# Patient Record
Sex: MALE | Race: WHITE
[De-identification: names, ages, dates, MRNs, and addresses within clinical notes are randomized per-mention and may not be internally consistent; named-entity substitution may affect disease eponyms.]

---

## 2018-01-29 ENCOUNTER — HOSPITAL ENCOUNTER (EMERGENCY)
Dept: HOSPITAL 72 - EMR | Age: 43
LOS: 1 days | Discharge: HOME | End: 2018-01-30
Payer: SELF-PAY

## 2018-01-29 VITALS — SYSTOLIC BLOOD PRESSURE: 151 MMHG | DIASTOLIC BLOOD PRESSURE: 69 MMHG

## 2018-01-29 VITALS — SYSTOLIC BLOOD PRESSURE: 150 MMHG | DIASTOLIC BLOOD PRESSURE: 66 MMHG

## 2018-01-29 VITALS — WEIGHT: 120 LBS | BODY MASS INDEX: 16.25 KG/M2 | HEIGHT: 72 IN

## 2018-01-29 VITALS — SYSTOLIC BLOOD PRESSURE: 148 MMHG | DIASTOLIC BLOOD PRESSURE: 64 MMHG

## 2018-01-29 DIAGNOSIS — R45.851: Primary | ICD-10-CM

## 2018-01-29 DIAGNOSIS — K31.84: ICD-10-CM

## 2018-01-29 DIAGNOSIS — S60.819A: ICD-10-CM

## 2018-01-29 DIAGNOSIS — R10.84: ICD-10-CM
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
## 2018-01-29 LAB
ADD MANUAL DIFF: NO
ALBUMIN SERPL-MCNC: 4.8 G/DL (ref 3.4–5)
ALBUMIN/GLOB SERPL: 1.3 {RATIO} (ref 1–2.7)
ALP SERPL-CCNC: 71 U/L (ref 46–116)
ALT SERPL-CCNC: 20 U/L (ref 12–78)
ANION GAP SERPL CALC-SCNC: 17 MMOL/L (ref 5–15)
APPEARANCE UR: CLEAR
APTT PPP: (no result) S
AST SERPL-CCNC: 22 U/L (ref 15–37)
BASOPHILS NFR BLD AUTO: 0.5 % (ref 0–2)
BILIRUB SERPL-MCNC: 0.8 MG/DL (ref 0.2–1)
BUN SERPL-MCNC: 18 MG/DL (ref 7–18)
CALCIUM SERPL-MCNC: 10 MG/DL (ref 8.5–10.1)
CHLORIDE SERPL-SCNC: 98 MMOL/L (ref 98–107)
CO2 SERPL-SCNC: 26 MMOL/L (ref 21–32)
CREAT SERPL-MCNC: 1.1 MG/DL (ref 0.55–1.3)
EOSINOPHIL NFR BLD AUTO: 0.1 % (ref 0–3)
ERYTHROCYTE [DISTWIDTH] IN BLOOD BY AUTOMATED COUNT: 12.6 % (ref 11.6–14.8)
GLOBULIN SER-MCNC: 3.6 G/DL
GLUCOSE UR STRIP-MCNC: NEGATIVE MG/DL
HCT VFR BLD CALC: 49.3 % (ref 42–52)
HGB BLD-MCNC: 15.7 G/DL (ref 14.2–18)
KETONES UR QL STRIP: (no result)
LEUKOCYTE ESTERASE UR QL STRIP: (no result)
LYMPHOCYTES NFR BLD AUTO: 11 % (ref 20–45)
MCV RBC AUTO: 87 FL (ref 80–99)
MONOCYTES NFR BLD AUTO: 2.6 % (ref 1–10)
NEUTROPHILS NFR BLD AUTO: 85.8 % (ref 45–75)
NITRITE UR QL STRIP: NEGATIVE
PH UR STRIP: 5 [PH] (ref 4.5–8)
PLATELET # BLD: 348 K/UL (ref 150–450)
POTASSIUM SERPL-SCNC: 3.8 MMOL/L (ref 3.5–5.1)
PROT UR QL STRIP: (no result)
RBC # BLD AUTO: 5.67 M/UL (ref 4.7–6.1)
SODIUM SERPL-SCNC: 141 MMOL/L (ref 136–145)
SP GR UR STRIP: 1.02 (ref 1–1.03)
UROBILINOGEN UR-MCNC: 4 MG/DL (ref 0–1)
WBC # BLD AUTO: 15.7 K/UL (ref 4.8–10.8)

## 2018-01-29 PROCEDURE — 81003 URINALYSIS AUTO W/O SCOPE: CPT

## 2018-01-29 PROCEDURE — 96361 HYDRATE IV INFUSION ADD-ON: CPT

## 2018-01-29 PROCEDURE — 96372 THER/PROPH/DIAG INJ SC/IM: CPT

## 2018-01-29 PROCEDURE — 80053 COMPREHEN METABOLIC PANEL: CPT

## 2018-01-29 PROCEDURE — 96374 THER/PROPH/DIAG INJ IV PUSH: CPT

## 2018-01-29 PROCEDURE — 85025 COMPLETE CBC W/AUTO DIFF WBC: CPT

## 2018-01-29 PROCEDURE — 36415 COLL VENOUS BLD VENIPUNCTURE: CPT

## 2018-01-29 PROCEDURE — 80307 DRUG TEST PRSMV CHEM ANLYZR: CPT

## 2018-01-29 PROCEDURE — 99284 EMERGENCY DEPT VISIT MOD MDM: CPT

## 2018-01-29 PROCEDURE — 80329 ANALGESICS NON-OPIOID 1 OR 2: CPT

## 2018-01-29 NOTE — EMERGENCY ROOM REPORT
History of Present Illness


General


Chief Complaint:  Behavioral Complaint


Source:  Patient


 (David Scott)





Present Illness


HPI


43 yo male presents to ER BIB ambulance presents to ER following attempted 

suicide. 


Patient reports he tried to cut his wrist with a piece of glass..


Patient reports he plans to hurt himself. Patient reports he does not plan to 

hurt anyone else.


Patient being seen by psychiatrist Too Worrell in [place]; patient reports 

he is in [place] on vacation.


Patient states he suffered a "traumatic emotional experience" recently while on 

vacation but would not state what event occurred..


Patient reports history of gastroparesis.


States "gastroparesis is going to kill me."


Patient reports inducing vomiting to alleviate gastroparesis pain.


Patient denies fever, SOB, chest pain.


Patient requesting fluids and medication for gastroparietal pain.


Patient reports marijuana use; states he has a prescription.


 (David Scott)


Allergies:  


Coded Allergies:  


     No Known Allergies (Unverified , 1/29/18)





Patient History


Past Medical History:  see triage record, psych hx


Pertinent Family History:  unable to obtain


Reviewed Nursing Documentation:  PMH: Agreed, PSxH: Agreed (David Scott)





Review of Systems


All Other Systems:  negative except mentioned in HPI


 (David Scott)





Physical Exam





Vital Signs








  Date Time  Temp Pulse Resp B/P (MAP) Pulse Ox O2 Delivery O2 Flow Rate FiO2


 


1/29/18 17:27 98.1 98 16 151/69 98 Room Air  








Sp02 EP Interpretation:  reviewed, normal


General Appearance:  alert, GCS 15, non-toxic, moderate distress - sitting in 

bed, attempting to induce vomiting to relieve pain symptoms, thin


Head:  normocephalic, atraumatic


Eyes:  bilateral eye normal inspection, bilateral eye PERRL


ENT:  hearing grossly normal, normal pharynx, no angioedema, normal voice, 

moist mucus membranes, pharyngeal erythema


Neck:  full range of motion, supple/symm/no masses


Respiratory:  chest non-tender, lungs clear, normal breath sounds, speaking 

full sentences


Cardiovascular #1:  regular rate, rhythm


Gastrointestinal:  non tender, soft, no mass, non-distended, no guarding, no 

rebound


Musculoskeletal:  back normal, gait/station normal, normal range of motion, non-

tender


Neurologic:  alert, oriented x3, responsive, motor strength/tone normal, 

sensory intact, speech normal


Psychiatric:  other - suicidial ideation


Skin:  no rash, warm/dry, palpation normal, abrasions - 3cm, linear, superficial

, no active bleeding


 (David Scott)





Medical Decision Making


PA Attestation


Dr. Renteria  is my supervising Physician whom patient management has been 

discussed with.


 (David Scott)


Diagnostic Impression:  


 Primary Impression:  


 Suicidal thoughts


 Additional Impression:  


 Abdominal pain


 Qualified Codes:  R10.84 - Generalized abdominal pain


ER Course


Pt. presents to the ED BIB ambulance c/o suicide attempt.





Ddx considered but are not limited to thoughts of suicide, substance abuse, 

depression, anxiety.


Patient placed on suicide precautions, began psych workup.





ORDERS: 


CBC, CMP


Urine drug screen


Serum alcohol


Salicylate level


Acetaminophen level





Haloperidol


Zofran


Normal saline


UA





ED COURSE:


Following administration of medication and fluids, patient reports feeling 

better and states he no longer has plans to hurt himself.


Patient resting comfortably, in no acute distress, states he feels better.





2100 On re-evaluation patient reports he "still plans" to kill himself. Patient 

requesting fluids and pain medication for gastroparesis.





Consult with Dr. Renteria, patient placed on hold pending psychiatric evaluation 

in the morning.





2115 Patient care transferred to Dr. Edge.





Labs








Test


  1/29/18


18:10


 


White Blood Count


  15.7 K/UL


(4.8-10.8)


 


Red Blood Count


  5.67 M/UL


(4.70-6.10)


 


Hemoglobin


  15.7 G/DL


(14.2-18.0)


 


Hematocrit


  49.3 %


(42.0-52.0)


 


Mean Corpuscular Volume 87 FL (80-99) 


 


Mean Corpuscular Hemoglobin


  27.7 PG


(27.0-31.0)


 


Mean Corpuscular Hemoglobin


Concent 31.8 G/DL


(32.0-36.0)


 


Red Cell Distribution Width


  12.6 %


(11.6-14.8)


 


Platelet Count


  348 K/UL


(150-450)


 


Mean Platelet Volume


  7.4 FL


(6.5-10.1)


 


Neutrophils (%) (Auto)


  85.8 %


(45.0-75.0)


 


Lymphocytes (%) (Auto)


  11.0 %


(20.0-45.0)


 


Monocytes (%) (Auto)


  2.6 %


(1.0-10.0)


 


Eosinophils (%) (Auto)


  0.1 %


(0.0-3.0)


 


Basophils (%) (Auto)


  0.5 %


(0.0-2.0)


 


Urine Color Brown 


 


Urine Appearance Clear 


 


Urine pH 5 (4.5-8.0) 


 


Urine Specific Gravity


  1.025


(1.005-1.035)


 


Urine Protein 1+ (NEGATIVE) 


 


Urine Glucose (UA)


  Negative


(NEGATIVE)


 


Urine Ketones 4+ (NEGATIVE) 


 


Urine Occult Blood


  Negative


(NEGATIVE)


 


Urine Nitrite


  Negative


(NEGATIVE)


 


Urine Bilirubin


  Negative


(NEGATIVE)


 


Urine Urobilinogen


  4 MG/DL


(0.0-1.0)


 


Urine Leukocyte Esterase 1+ (NEGATIVE) 


 


Urine RBC


  0-2 /HPF (0 -


0)


 


Urine WBC


  0-2 /HPF (0 -


0)


 


Urine Squamous Epithelial


Cells None /LPF


(NONE/OCC)


 


Urine Bacteria


  Occasional


/HPF (NONE)


 


Urine Mucus


  Moderate /LPF


(NONE/OCC)


 


Sodium Level


  141 MMOL/L


(136-145)


 


Potassium Level


  3.8 MMOL/L


(3.5-5.1)


 


Chloride Level


  98 MMOL/L


()


 


Carbon Dioxide Level


  26 MMOL/L


(21-32)


 


Anion Gap


  17 mmol/L


(5-15)


 


Blood Urea Nitrogen


  18 mg/dL


(7-18)


 


Creatinine


  1.1 MG/DL


(0.55-1.30)


 


Estimat Glomerular Filtration


Rate > 60 mL/min


(>60)


 


Glucose Level


  174 MG/DL


()


 


Calcium Level


  10.0 MG/DL


(8.5-10.1)


 


Total Bilirubin


  0.8 MG/DL


(0.2-1.0)


 


Aspartate Amino Transf


(AST/SGOT) 22 U/L (15-37) 


 


 


Alanine Aminotransferase


(ALT/SGPT) 20 U/L (12-78) 


 


 


Alkaline Phosphatase


  71 U/L


()


 


Total Protein


  8.4 G/DL


(6.4-8.2)


 


Albumin


  4.8 G/DL


(3.4-5.0)


 


Globulin 3.6 g/dL 


 


Albumin/Globulin Ratio 1.3 (1.0-2.7) 


 


Salicylates Level


  3.1 ug/mL


(2.8-20)


 


Urine Opiates Screen


  Negative


(NEGATIVE)


 


Acetaminophen Level


  < 2 MCG/ML


(10-30)


 


Urine Barbiturates Screen


  Negative


(NEGATIVE)


 


Phencyclidine (PCP) Screen


  Negative


(NEGATIVE)


 


Urine Amphetamines Screen


  Negative


(NEGATIVE)


 


Urine Benzodiazepines Screen


  Negative


(NEGATIVE)


 


Urine Cocaine Screen


  Negative


(NEGATIVE)


 


Urine Marijuana (THC) Screen


  Positive


(NEGATIVE)


 


Serum Alcohol < 3 mg/dL 








 (David Scott)


ER Course


Patient signed out to me.  Initially patient came in because he said he felt 

suicidal.  He said he felt this way because of his abdominal pain from 

gastroparesis.  He slept for several hours and now is awake.  He said he felt 

much better.  He does not feel suicidal anymore.  No particular plan.  Wants to 

go home.  We'll discharge home.





I spoke with his mom, over the phone.  She was concerned about him because he 

stopped taking his bipolar medication.  He said that his medication is causing 

his gastroparesis to be worse.  Is the reason why he stopped all of it.  He is 

calm and courteous.  Spoke with his mom over the phone.  He is willing to start 

back on Abilify.  I also prescribed Abilify for him.


 (AUGUSTO GIL M.D.)





Last Vital Signs








  Date Time  Temp Pulse Resp B/P (MAP) Pulse Ox O2 Delivery O2 Flow Rate FiO2


 


1/29/18 17:35 98.1 98 16 151/69 98 Room Air  








 (David Scott)


Status:  improved


 (AUGUSTO GIL M.D.)


Disposition:  HOME, SELF-CARE


Condition:  Stable


Scripts


Aripiprazole* (ABILIFY*) 10 Mg Tablet


10 MG ORAL DAILY, #30 TAB


   Prov: AUGUSTO GIL M.D.         1/30/18 


Ondansetron Odt* (ZOFRAN ODT*) 4 Mg Tab.rapdis


4 MG ORAL Q6H Y for Nausea & Vomiting, #30 TAB


   Prov: AUGUSTO GIL M.D.         1/30/18





Additional Instructions:  


Followup with your DrMara in 7 days.  Followup with your psychiatrist in 7 days.  

Return if worse.











David Scott Jan 29, 2018 17:46


AUGUSTO GIL M.D. Jan 30, 2018 03:17

## 2018-01-30 VITALS — SYSTOLIC BLOOD PRESSURE: 140 MMHG | DIASTOLIC BLOOD PRESSURE: 72 MMHG

## 2018-01-30 VITALS — SYSTOLIC BLOOD PRESSURE: 140 MMHG | DIASTOLIC BLOOD PRESSURE: 62 MMHG

## 2018-01-30 VITALS — DIASTOLIC BLOOD PRESSURE: 68 MMHG | SYSTOLIC BLOOD PRESSURE: 144 MMHG
